# Patient Record
Sex: MALE | Race: WHITE | Employment: FULL TIME | URBAN - METROPOLITAN AREA
[De-identification: names, ages, dates, MRNs, and addresses within clinical notes are randomized per-mention and may not be internally consistent; named-entity substitution may affect disease eponyms.]

---

## 2018-03-13 ENCOUNTER — HOSPITAL ENCOUNTER (EMERGENCY)
Age: 38
Discharge: HOME | End: 2018-03-13
Attending: EMERGENCY MEDICINE
Payer: COMMERCIAL

## 2018-03-13 VITALS
DIASTOLIC BLOOD PRESSURE: 68 MMHG | WEIGHT: 200 LBS | RESPIRATION RATE: 14 BRPM | BODY MASS INDEX: 23.14 KG/M2 | TEMPERATURE: 98 F | SYSTOLIC BLOOD PRESSURE: 108 MMHG | OXYGEN SATURATION: 98 % | HEIGHT: 78 IN | HEART RATE: 78 BPM
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
## 2018-03-13 DIAGNOSIS — S80.811A ABRASION OF RIGHT CALF, INITIAL ENCOUNTER: ICD-10-CM

## 2018-03-13 DIAGNOSIS — S81.851A DOG BITE OF CALF, RIGHT, INITIAL ENCOUNTER: Primary | ICD-10-CM

## 2018-03-13 PROCEDURE — 99282 EMERGENCY DEPT VISIT SF MDM: CPT

## 2018-03-13 ASSESSMENT — PAIN DESCRIPTION - PAIN TYPE: TYPE: ACUTE PAIN

## 2018-03-13 ASSESSMENT — PAIN DESCRIPTION - LOCATION: LOCATION: LEG

## 2018-03-13 ASSESSMENT — PAIN SCALES - GENERAL: PAINLEVEL_OUTOF10: 1

## 2018-03-13 ASSESSMENT — PAIN DESCRIPTION - ORIENTATION: ORIENTATION: POSTERIOR;RIGHT;LOWER

## 2018-03-13 ASSESSMENT — PAIN DESCRIPTION - FREQUENCY: FREQUENCY: CONTINUOUS

## 2018-03-13 ASSESSMENT — PAIN DESCRIPTION - DESCRIPTORS: DESCRIPTORS: DISCOMFORT

## 2018-12-27 ENCOUNTER — HOSPITAL ENCOUNTER (OUTPATIENT)
Age: 38
Discharge: HOME | End: 2018-12-29

## 2018-12-27 PROCEDURE — 88302 TISSUE EXAM BY PATHOLOGIST: CPT

## 2020-06-02 ENCOUNTER — HOSPITAL ENCOUNTER (EMERGENCY)
Age: 40
Discharge: HOME | End: 2020-06-03
Attending: EMERGENCY MEDICINE
Payer: COMMERCIAL

## 2020-06-02 ENCOUNTER — APPOINTMENT (OUTPATIENT)
Dept: CT IMAGING | Age: 40
End: 2020-06-02
Payer: COMMERCIAL

## 2020-06-02 VITALS
OXYGEN SATURATION: 97 % | HEART RATE: 85 BPM | WEIGHT: 210 LBS | TEMPERATURE: 98.1 F | SYSTOLIC BLOOD PRESSURE: 122 MMHG | BODY MASS INDEX: 24.3 KG/M2 | DIASTOLIC BLOOD PRESSURE: 74 MMHG | HEIGHT: 78 IN | RESPIRATION RATE: 16 BRPM

## 2020-06-02 PROCEDURE — 12001 RPR S/N/AX/GEN/TRNK 2.5CM/<: CPT

## 2020-06-02 PROCEDURE — 70450 CT HEAD/BRAIN W/O DYE: CPT

## 2020-06-02 PROCEDURE — 6360000002 HC RX W HCPCS: Performed by: EMERGENCY MEDICINE

## 2020-06-02 PROCEDURE — 90715 TDAP VACCINE 7 YRS/> IM: CPT | Performed by: EMERGENCY MEDICINE

## 2020-06-02 PROCEDURE — 90471 IMMUNIZATION ADMIN: CPT | Performed by: EMERGENCY MEDICINE

## 2020-06-02 PROCEDURE — 6370000000 HC RX 637 (ALT 250 FOR IP): Performed by: EMERGENCY MEDICINE

## 2020-06-02 PROCEDURE — 99283 EMERGENCY DEPT VISIT LOW MDM: CPT

## 2020-06-02 RX ORDER — BUSPIRONE HYDROCHLORIDE 10 MG/1
10 TABLET ORAL 3 TIMES DAILY
COMMUNITY

## 2020-06-02 RX ORDER — ACETAMINOPHEN 500 MG
1000 TABLET ORAL ONCE
Status: COMPLETED | OUTPATIENT
Start: 2020-06-02 | End: 2020-06-02

## 2020-06-02 RX ADMIN — ACETAMINOPHEN 1000 MG: 500 TABLET ORAL at 22:18

## 2020-06-02 RX ADMIN — TETANUS TOXOID, REDUCED DIPHTHERIA TOXOID AND ACELLULAR PERTUSSIS VACCINE, ADSORBED 0.5 ML: 5; 2.5; 8; 8; 2.5 SUSPENSION INTRAMUSCULAR at 22:18

## 2020-06-02 ASSESSMENT — PAIN SCALES - GENERAL: PAINLEVEL_OUTOF10: 1

## 2020-06-03 PROCEDURE — 2500000003 HC RX 250 WO HCPCS

## 2020-06-03 RX ORDER — IBUPROFEN 600 MG/1
600 TABLET ORAL EVERY 8 HOURS PRN
Qty: 15 TABLET | Refills: 0 | Status: SHIPPED | OUTPATIENT
Start: 2020-06-03 | End: 2020-06-08

## 2020-06-03 RX ORDER — LIDOCAINE HYDROCHLORIDE AND EPINEPHRINE 10; 10 MG/ML; UG/ML
INJECTION, SOLUTION INFILTRATION; PERINEURAL
Status: COMPLETED
Start: 2020-06-03 | End: 2020-06-03

## 2020-06-03 RX ADMIN — LIDOCAINE HYDROCHLORIDE,EPINEPHRINE BITARTRATE: 10; .01 INJECTION, SOLUTION INFILTRATION; PERINEURAL at 01:23

## 2020-06-03 NOTE — ED PROVIDER NOTES
HPI:  6/3/20, Time: 12:42 AM EDT        Patsy Madrigal is a 37 y.o. male presenting to the ED for head injury work related, beginning 2 hours ago. The complaint has been persistent, severe in severity, and worsened by nothing. Patient states that he simply stood up and was not wearing his hard hat and he hit his head on the side of the house. No loss of consciousness, no neck pain no other injury sustained. Patient denies any other complaints. Review of Systems:   Pertinent positives and negatives are stated within HPI, all other systems reviewed and are negative.      --------------------------------------------- PAST HISTORY ---------------------------------------------  Past Medical History:  has a past medical history of Chest discomfort, Palpitations, Ringing in ears, and Wears glasses. Past Surgical History:  has no past surgical history on file. Social History:  reports that he has quit smoking. He has never used smokeless tobacco. He reports that he does not drink alcohol or use drugs. Family History: family history includes Cancer in an other family member. The patients home medications have been reviewed. Allergies: Patient has no known allergies. -------------------------------------------------- RESULTS -------------------------------------------------  All laboratory and radiology results have been personally reviewed by myself   LABS:  No results found for this visit on 06/02/20. RADIOLOGY:  Interpreted by Radiologist.  [address]   Final Result      No evidence of acute intracranial hemorrhage or edema. ------------------------- NURSING NOTES AND VITALS REVIEWED ---------------------------    The nursing notes within the ED encounter and vital signs as below have been reviewed.    /74   Pulse 85   Temp 98.1 °F (36.7 °C) (Temporal)   Resp 16   Ht 6' 6\" (1.981 m)   Wt 210 lb (95.3 kg)   SpO2 97%   BMI 24.27 kg/m²   Oxygen Saturation Interpretation: Normal      ---------------------------------------------------PHYSICAL EXAM--------------------------------------      Constitutional/General: Alert and oriented x3, well appearing, non toxic in NAD  Head: 2.5 cm laceration of the right frontal scalp. Wound is gaping and does warrant suture repair  No raccoon eye sign, no noel sign, no hemotympanum. Eyes: PERRL, EOMI  Mouth: Oropharynx clear, handling secretions, no trismus  Neck: Supple, full ROM,   Pulmonary: Lungs clear to auscultation bilaterally, no wheezes, rales, or rhonchi. Not in respiratory distress  Cardiovascular:  Regular rate and rhythm, no murmurs, gallops, or rubs. 2+ distal pulses  Abdomen: Soft, non tender, non distended,   Extremities: Moves all extremities x 4. Warm and well perfused  Skin: warm and dry without rash  Neurologic: GCS 15, cranial nerves II through XII grossly intact no focal deficit on meningeal signs  Psych: Normal Affect    ------------------------------ ED COURSE/MEDICAL DECISION MAKING----------------------  Medications   lidocaine-EPINEPHrine 1 percent-1:[id number] injection (has no administration in time range)   Tetanus-Diphth-Acell Pertussis (BOOSTRIX) injection 0.5 mL (0.5 mLs Intramuscular Given 6/2/20 2218)   acetaminophen (TYLENOL) tablet 1,000 mg (1,000 mg Oral Given 6/2/20 2218)       ED COURSE:     Medical Decision Making:   Differential diagnoses: Closed head injury, skull fracture, epidural hematoma, subdural hematoma, other intracranial hemorrhage, to name a few. CT head showed no acute intracranial hemorrhage nor evidence of skull fracture. Patient was advised that he nonetheless likely sustained a mild concussion and he was given home-going head injury instruction sheets. PROCEDURE NOTE  6/3/20       Time:    LACERATION REPAIR  Risks, benefits and alternatives (for applicable procedures below) described. Performed By: Erik Auguste MD.  Laceration #: 1.   Location: Scalp  Length: 2.5 cm. The wound area was cleansend with shur-clens and draped in a sterile fashion. Local Anesthesia:  Lidocaine 1% with epinephrine. The wound was explored with the following results:  no foreign body or tendon injury seen. Debridement: None. Undermining: None. Wound Margins Revised: None. Flaps Aligned: no. The wound was closed with staples. Dressing:  bacitracin was placed. Total number staples:  6. There were no additional lacerations requiring repair. Counseling: The emergency provider has spoken with the patient and discussed todays results, in addition to providing specific details for the plan of care and counseling regarding the diagnosis and prognosis. Questions are answered at this time and they are agreeable with the plan.    --------------------------------- IMPRESSION AND DISPOSITION ---------------------------------    IMPRESSION  1. Closed head injury, initial encounter        DISPOSITION  Disposition: Discharge to home  Patient condition is stable      NOTE: This report was transcribed using voice recognition software.  Every effort was made to ensure accuracy; however, inadvertent computerized transcription errors may be present        Gemma Campbell MD  06/03/20 Joel Talbot MD  06/03/20 0110

## 2020-06-03 NOTE — ED NOTES
Instructions provided and questions answered. Prescriptions verified with patient. Workers comp papers completed and copied.      Eliana Holt RN  06/03/20 [id number]